# Patient Record
Sex: MALE | Race: WHITE | NOT HISPANIC OR LATINO | URBAN - METROPOLITAN AREA
[De-identification: names, ages, dates, MRNs, and addresses within clinical notes are randomized per-mention and may not be internally consistent; named-entity substitution may affect disease eponyms.]

---

## 2024-10-11 ENCOUNTER — EMERGENCY (EMERGENCY)
Facility: HOSPITAL | Age: 29
LOS: 1 days | Discharge: LEFT BEFORE DISCHARGE | End: 2024-10-11
Admitting: EMERGENCY MEDICINE
Payer: SELF-PAY

## 2024-10-11 VITALS
SYSTOLIC BLOOD PRESSURE: 117 MMHG | OXYGEN SATURATION: 96 % | WEIGHT: 160.06 LBS | HEIGHT: 65 IN | TEMPERATURE: 99 F | HEART RATE: 75 BPM | RESPIRATION RATE: 18 BRPM | DIASTOLIC BLOOD PRESSURE: 74 MMHG

## 2024-10-11 PROCEDURE — L9991: CPT
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
## 2024-12-27 ENCOUNTER — EMERGENCY (EMERGENCY)
Facility: HOSPITAL | Age: 29
LOS: 1 days | Discharge: HOME | End: 2024-12-27
Attending: STUDENT IN AN ORGANIZED HEALTH CARE EDUCATION/TRAINING PROGRAM | Admitting: STUDENT IN AN ORGANIZED HEALTH CARE EDUCATION/TRAINING PROGRAM
Payer: COMMERCIAL

## 2024-12-27 VITALS
OXYGEN SATURATION: 98 % | RESPIRATION RATE: 20 BRPM | TEMPERATURE: 98 F | SYSTOLIC BLOOD PRESSURE: 115 MMHG | DIASTOLIC BLOOD PRESSURE: 75 MMHG | WEIGHT: 164.91 LBS | HEART RATE: 84 BPM

## 2024-12-27 VITALS
OXYGEN SATURATION: 98 % | RESPIRATION RATE: 20 BRPM | HEART RATE: 98 BPM | SYSTOLIC BLOOD PRESSURE: 119 MMHG | DIASTOLIC BLOOD PRESSURE: 89 MMHG

## 2024-12-27 PROCEDURE — 99284 EMERGENCY DEPT VISIT MOD MDM: CPT

## 2024-12-27 PROCEDURE — 93010 ELECTROCARDIOGRAM REPORT: CPT

## 2024-12-27 RX ORDER — EPINEPHRINE 1 MG/ML(1)
0.3 AMPUL (ML) INJECTION
Qty: 2 | Refills: 1
Start: 2024-12-27 | End: 2024-12-28

## 2024-12-27 RX ORDER — EPINEPHRINE 1 MG/ML(1)
0.3 AMPUL (ML) INJECTION ONCE
Refills: 0 | Status: COMPLETED | OUTPATIENT
Start: 2024-12-27 | End: 2024-12-27

## 2024-12-27 RX ORDER — PREDNISONE 20 MG/1
2 TABLET ORAL
Qty: 8 | Refills: 0
Start: 2024-12-27 | End: 2024-12-30

## 2024-12-27 RX ORDER — DIPHENHYDRAMINE HCL 25 MG
50 CAPSULE ORAL EVERY 6 HOURS
Refills: 0 | Status: DISCONTINUED | OUTPATIENT
Start: 2024-12-27 | End: 2024-12-31

## 2024-12-27 RX ORDER — FAMOTIDINE 20 MG/1
1 TABLET, FILM COATED ORAL
Qty: 4 | Refills: 0
Start: 2024-12-27 | End: 2024-12-30

## 2024-12-27 RX ORDER — METHYLPREDNISOLONE SOD SUCC 125 MG
60 VIAL (EA) INJECTION ONCE
Refills: 0 | Status: COMPLETED | OUTPATIENT
Start: 2024-12-27 | End: 2024-12-27

## 2024-12-27 RX ORDER — FAMOTIDINE 20 MG/1
20 TABLET, FILM COATED ORAL ONCE
Refills: 0 | Status: COMPLETED | OUTPATIENT
Start: 2024-12-27 | End: 2024-12-27

## 2024-12-27 RX ADMIN — Medication 50 MILLIGRAM(S): at 20:07

## 2024-12-27 RX ADMIN — Medication 0.3 MILLIGRAM(S): at 20:08

## 2024-12-27 RX ADMIN — Medication 60 MILLIGRAM(S): at 20:07

## 2024-12-27 RX ADMIN — FAMOTIDINE 20 MILLIGRAM(S): 20 TABLET, FILM COATED ORAL at 20:08

## 2024-12-27 NOTE — ED PROVIDER NOTE - CLINICAL SUMMARY MEDICAL DECISION MAKING FREE TEXT BOX
Patient presents to the ED due to hives that are pruritic with a throat closing sensation.  Vital signs are stable and afebrile.  Oropharynx is clear.  He is in no acute distress.  Patient given IM epi, Benadryl, Pepcid, Solu-Medrol.  Monitored in the ED without evidence of emergence reaction.  His symptoms improved.  Patient discharged with prescription for EpiPen, Pepcid, steroids, Benadryl, given a follow-up with rheumatology and allergy.    Advised to not eat the chicken sandwich that he had earlier today at work as that may be a trigger.

## 2024-12-27 NOTE — ED ADULT NURSE NOTE - PRO INTERPRETER NEED 2
Spoke to patient's mother via telephone.  She requested a call back to discuss patient's care.  She is looking to re-establish patient with a psychiatrist and get updated intellectual and neuropsychological testing for insurance purposes. This writer has not seen patient in over three years.  Informed patient's mother that this writer is no longer part of the psychiatry department, but this writer would attempt to assist with getting his care re-established there (he was formerly seen by Bandar Ayoub and Sue in the Psychiatry Department).  Also recommended [facility] for updated testing.   English

## 2024-12-27 NOTE — ED PROVIDER NOTE - PATIENT PORTAL LINK FT
You can access the FollowMyHealth Patient Portal offered by [facility] by registering at the following website: [email or web address]. By joining [facility]’s FollowMyHealth portal, you will also be able to view your health information using other applications (apps) compatible with our system.

## 2024-12-27 NOTE — ED PROVIDER NOTE - OBJECTIVE STATEMENT
This is an otherwise healthy 29-year-old male who presents to the ED complaining of a pruritic rash on his chest, abdomen back and left thigh.  He also states he felt a throat closing sensation.  He took Benadryl just prior to arrival.  This started around 6 or 7 hours ago.  Prior to that he had a chicken sandwich which she has had before.  He has no known allergies.  He states the rash on his chest is painful.  Denies any fever chills diaphoresis, recent travel, new laundry detergent, new clothes, new shampoos or any other exacerbating factor.

## 2024-12-27 NOTE — ED PROVIDER NOTE - NSFOLLOWUPINSTRUCTIONS_ED_ALL_ED_FT
Rest, drink plenty of fluids.  Advance activity as tolerated.  Continue all previously prescribed medications as directed.  Follow up with your primary care physician in 48-72 hours- bring copies of your results.  Return to the ER for worsening or persistent symptoms, and/or ANY NEW OR CONCERNING SYMPTOMS. If you have issues obtaining follow up, please call: [phone] to obtain a doctor or specialist who takes your insurance in your area.  You may call [phone] to make an appointment with the internal medicine clinic.

## 2024-12-27 NOTE — ED PROVIDER NOTE - PHYSICAL EXAMINATION
Skin: He has raised urticaria along the chest that is pruritic and blanching, and middle of the urticaria he does have darkened skin.  Urticaria to the left thigh and intermittently throughout his back.    Oropharynx is clear, speaking full sentences, neck is supple.

## 2024-12-27 NOTE — ED ADULT NURSE NOTE - NSFALLUNIVINTERV_ED_ALL_ED
Bed/Stretcher in lowest position, wheels locked, appropriate side rails in place/Call bell, personal items and telephone in reach/Instruct patient to call for assistance before getting out of bed/chair/stretcher/Non-slip footwear applied when patient is off stretcher/[place] to call system/Physically safe environment - no spills, clutter or unnecessary equipment/Purposeful proactive rounding/Room/bathroom lighting operational, light cord in reach

## 2024-12-27 NOTE — ED ADULT NURSE NOTE - NSFALLCONCLUSION_ED_ALL_ED
----- Message from Carmen Parish sent at 6/8/2022 10:12 AM CDT -----  Regarding: COVID TEST ORDER  Hello,    Patient is scheduled for a covid test on 7/20  for their procedure on 7/22. Please enter in covid test order.     MD nicole LAUGHLIN        Thank you     Carmen      [employer]

## 2024-12-27 NOTE — ED ADULT TRIAGE NOTE - CHIEF COMPLAINT QUOTE
Patient complains of chest pain and rash to abdomen, face and upper back that started 2 hours ago. Patient also complaining of  feeling like his throat is closing, patient denies eating anything new, or trying new soaps or clothing. Patient able to speak in clear sentences, respirations equal and unlabored on room air, no drooling noted at this time. No pertinent medical history.

## 2024-12-27 NOTE — ED PROVIDER NOTE - NSPTACCESSSVCSAPPTDETAILS_ED_ALL_ED_FT
Anaphylaxis with pruritic rash.  Also needs rheumatology follow-up concern for may be a vasculitis component

## 2024-12-27 NOTE — ED ADULT NURSE NOTE - OBJECTIVE STATEMENT
Pt received to intake 2   , awake and alert, A&OX4, ambulatory. C/o rash all over body and feels like his thrat is closing. pt speaking in full sentences. pt placed on cardaic monitor.NSR. Respirations even and unlabored. Denies SOB, N/V, HA, dizziness, palpitations, blurry vision. 20G IV placed to left AC    . Bed in lowest position, call bell within reach. Safety maintained.pt medicated as per MD orders.